# Patient Record
Sex: FEMALE | Race: ASIAN | NOT HISPANIC OR LATINO
[De-identification: names, ages, dates, MRNs, and addresses within clinical notes are randomized per-mention and may not be internally consistent; named-entity substitution may affect disease eponyms.]

---

## 2024-04-16 DIAGNOSIS — E11.9 TYPE 2 DIABETES MELLITUS W/OUT COMPLICATIONS: ICD-10-CM

## 2024-04-16 PROBLEM — Z00.00 ENCOUNTER FOR PREVENTIVE HEALTH EXAMINATION: Status: ACTIVE | Noted: 2024-04-16

## 2024-04-17 ENCOUNTER — APPOINTMENT (OUTPATIENT)
Dept: COLORECTAL SURGERY | Facility: CLINIC | Age: 69
End: 2024-04-17
Payer: MEDICARE

## 2024-04-17 VITALS
TEMPERATURE: 98 F | HEIGHT: 61.42 IN | SYSTOLIC BLOOD PRESSURE: 114 MMHG | WEIGHT: 145 LBS | DIASTOLIC BLOOD PRESSURE: 64 MMHG | HEART RATE: 84 BPM | BODY MASS INDEX: 27.03 KG/M2

## 2024-04-17 DIAGNOSIS — K64.8 OTHER HEMORRHOIDS: ICD-10-CM

## 2024-04-17 PROCEDURE — 46600 DIAGNOSTIC ANOSCOPY SPX: CPT

## 2024-04-17 PROCEDURE — 99202 OFFICE O/P NEW SF 15 MIN: CPT | Mod: 25

## 2024-04-17 RX ORDER — DOCUSATE SODIUM 100 MG/1
100 CAPSULE, LIQUID FILLED ORAL
Refills: 0 | Status: ACTIVE | COMMUNITY

## 2024-04-17 RX ORDER — IBUPROFEN 200 MG
600 CAPSULE ORAL
Refills: 0 | Status: ACTIVE | COMMUNITY

## 2024-04-17 RX ORDER — DIPHENHYDRAMINE HYDROCHLORIDE, ZINC ACETATE 20; 1 MG/G; MG/G
CREAM TOPICAL
Refills: 0 | Status: ACTIVE | COMMUNITY

## 2024-04-17 RX ORDER — ALENDRONATE SODIUM 70 MG/1
70 TABLET ORAL
Refills: 0 | Status: ACTIVE | COMMUNITY

## 2024-04-17 RX ORDER — METFORMIN HYDROCHLORIDE 500 MG/1
500 TABLET, COATED ORAL
Refills: 0 | Status: ACTIVE | COMMUNITY

## 2024-04-17 RX ORDER — BISACODYL 5 MG/1
5 TABLET ORAL
Refills: 0 | Status: ACTIVE | COMMUNITY

## 2024-04-17 RX ORDER — LINACLOTIDE 145 UG/1
145 CAPSULE, GELATIN COATED ORAL
Refills: 0 | Status: ACTIVE | COMMUNITY

## 2024-04-17 RX ORDER — PROPYLTHIOURACIL 50 MG/1
50 TABLET ORAL
Refills: 0 | Status: ACTIVE | COMMUNITY

## 2024-04-17 RX ORDER — SENNOSIDES 8.6 MG/1
8.6 CAPSULE, GELATIN COATED ORAL
Refills: 0 | Status: ACTIVE | COMMUNITY

## 2024-04-17 RX ORDER — LUBIPROSTONE 24 UG/1
24 CAPSULE ORAL
Refills: 0 | Status: ACTIVE | COMMUNITY

## 2024-04-17 RX ORDER — CHROMIUM 200 MCG
TABLET ORAL
Refills: 0 | Status: ACTIVE | COMMUNITY

## 2024-04-17 RX ORDER — HYDROCORTISONE 2.5% 25 MG/G
2.5 CREAM TOPICAL
Refills: 0 | Status: ACTIVE | COMMUNITY

## 2024-04-17 NOTE — HISTORY OF PRESENT ILLNESS
[FreeTextEntry1] : 67 yo Cantonese speaking F presents for initial evaluation   PMH DMII PSH hysterectomy All: None Denies FMH CRC Last colonoscopy in 2023  Reports anal swelling.  Occasional discomfort with frequent bowel movements. Denies bleeding. Occasional itch.   Symptoms present for over 10 years.

## 2024-04-17 NOTE — PHYSICAL EXAM
[Excoriation] : no perianal excoriation [Normal] : was normal [None] : there was no rectal mass  [de-identified] : small anterior external hemorrhoidal tag  [FreeTextEntry1] : Medical assistant was present for the entire exam.  Anoscopy was performed for evaluation of the patients rectal bleeding  history . The risks, benefits and alternatives were reviewed.  A lighted anoscope was passed into the anal canal and the entire anal mucosal surface was inspected..   The findings revealed minimal internal hemorrhoids. No masses or lesions were identified.

## 2024-04-17 NOTE — ASSESSMENT
[FreeTextEntry1] : Advised conservative management with topical HC cream. Recommend trying to improve bowel habits and avoidance of increased stool frequency.  I had a detailed discussion with the patient regarding optimization of bowel movements with increasing daily fiber and water intake. Both synthetic and dietary fiber recommendations were reviewed. I had strongly counseled the patient regarding the need for increasing water to help improve  bowel function.  I discussed with the patient that improving  bowel function will alleviate  hemorrhoidal symptoms.  Advised return in 4-6 weeks if symptoms are persistent. A Chinese  was utilized for the entire visit. All questions were addressed.